# Patient Record
Sex: MALE | Race: WHITE | NOT HISPANIC OR LATINO | Employment: FULL TIME | URBAN - METROPOLITAN AREA
[De-identification: names, ages, dates, MRNs, and addresses within clinical notes are randomized per-mention and may not be internally consistent; named-entity substitution may affect disease eponyms.]

---

## 2017-03-28 RX ORDER — OMEPRAZOLE 20 MG/1
CAPSULE, DELAYED RELEASE ORAL
Qty: 90 CAPSULE | Refills: 1 | Status: SHIPPED | OUTPATIENT
Start: 2017-03-28 | End: 2017-12-27 | Stop reason: SDUPTHER

## 2017-07-31 ENCOUNTER — TELEPHONE (OUTPATIENT)
Dept: INTERNAL MEDICINE | Facility: CLINIC | Age: 72
End: 2017-07-31

## 2017-07-31 ENCOUNTER — LAB VISIT (OUTPATIENT)
Dept: LAB | Facility: HOSPITAL | Age: 72
End: 2017-07-31
Attending: FAMILY MEDICINE
Payer: MEDICARE

## 2017-07-31 DIAGNOSIS — D50.9 IRON DEFICIENCY ANEMIA, UNSPECIFIED IRON DEFICIENCY ANEMIA TYPE: ICD-10-CM

## 2017-07-31 DIAGNOSIS — Z00.00 ROUTINE GENERAL MEDICAL EXAMINATION AT A HEALTH CARE FACILITY: ICD-10-CM

## 2017-07-31 DIAGNOSIS — I25.10 CORONARY ARTERY DISEASE INVOLVING NATIVE CORONARY ARTERY WITHOUT ANGINA PECTORIS, UNSPECIFIED WHETHER NATIVE OR TRANSPLANTED HEART: ICD-10-CM

## 2017-07-31 DIAGNOSIS — I10 ESSENTIAL HYPERTENSION: ICD-10-CM

## 2017-07-31 DIAGNOSIS — R73.09 ABNORMAL GLUCOSE: ICD-10-CM

## 2017-07-31 DIAGNOSIS — Z12.5 PROSTATE CANCER SCREENING: ICD-10-CM

## 2017-07-31 DIAGNOSIS — E78.5 HYPERLIPIDEMIA, UNSPECIFIED HYPERLIPIDEMIA TYPE: ICD-10-CM

## 2017-07-31 DIAGNOSIS — Z00.00 ROUTINE GENERAL MEDICAL EXAMINATION AT A HEALTH CARE FACILITY: Primary | ICD-10-CM

## 2017-07-31 LAB
ALBUMIN SERPL BCP-MCNC: 3.4 G/DL
ALP SERPL-CCNC: 63 U/L
ALT SERPL W/O P-5'-P-CCNC: 9 U/L
ANION GAP SERPL CALC-SCNC: 12 MMOL/L
AST SERPL-CCNC: 13 U/L
BASOPHILS # BLD AUTO: 0.03 K/UL
BASOPHILS NFR BLD: 0.4 %
BILIRUB SERPL-MCNC: 0.5 MG/DL
BUN SERPL-MCNC: 26 MG/DL
CALCIUM SERPL-MCNC: 9.2 MG/DL
CHLORIDE SERPL-SCNC: 105 MMOL/L
CHOLEST/HDLC SERPL: 2.8 {RATIO}
CO2 SERPL-SCNC: 23 MMOL/L
COMPLEXED PSA SERPL-MCNC: 1.5 NG/ML
CREAT SERPL-MCNC: 1.5 MG/DL
DIFFERENTIAL METHOD: ABNORMAL
EOSINOPHIL # BLD AUTO: 0.4 K/UL
EOSINOPHIL NFR BLD: 4.5 %
ERYTHROCYTE [DISTWIDTH] IN BLOOD BY AUTOMATED COUNT: 14.3 %
EST. GFR  (AFRICAN AMERICAN): 54.1 ML/MIN/1.73 M^2
EST. GFR  (NON AFRICAN AMERICAN): 46.8 ML/MIN/1.73 M^2
GLUCOSE SERPL-MCNC: 87 MG/DL
HCT VFR BLD AUTO: 38.7 %
HDL/CHOLESTEROL RATIO: 35.5 %
HDLC SERPL-MCNC: 141 MG/DL
HDLC SERPL-MCNC: 50 MG/DL
HGB BLD-MCNC: 12.4 G/DL
IRON SERPL-MCNC: 75 UG/DL
LDLC SERPL CALC-MCNC: 73.2 MG/DL
LYMPHOCYTES # BLD AUTO: 2.1 K/UL
LYMPHOCYTES NFR BLD: 27.4 %
MCH RBC QN AUTO: 29.4 PG
MCHC RBC AUTO-ENTMCNC: 32 G/DL
MCV RBC AUTO: 92 FL
MONOCYTES # BLD AUTO: 0.6 K/UL
MONOCYTES NFR BLD: 7.3 %
NEUTROPHILS # BLD AUTO: 4.7 K/UL
NEUTROPHILS NFR BLD: 60.1 %
NONHDLC SERPL-MCNC: 91 MG/DL
PLATELET # BLD AUTO: 225 K/UL
PMV BLD AUTO: 12.4 FL
POTASSIUM SERPL-SCNC: 5.2 MMOL/L
PROT SERPL-MCNC: 6.8 G/DL
RBC # BLD AUTO: 4.22 M/UL
SATURATED IRON: 22 %
SODIUM SERPL-SCNC: 140 MMOL/L
T4 FREE SERPL-MCNC: 0.99 NG/DL
TOTAL IRON BINDING CAPACITY: 345 UG/DL
TRANSFERRIN SERPL-MCNC: 233 MG/DL
TRIGL SERPL-MCNC: 89 MG/DL
TSH SERPL DL<=0.005 MIU/L-ACNC: 3.33 UIU/ML
WBC # BLD AUTO: 7.8 K/UL

## 2017-07-31 PROCEDURE — 36415 COLL VENOUS BLD VENIPUNCTURE: CPT | Mod: PO

## 2017-07-31 PROCEDURE — 84153 ASSAY OF PSA TOTAL: CPT

## 2017-07-31 PROCEDURE — 83540 ASSAY OF IRON: CPT

## 2017-07-31 PROCEDURE — 84443 ASSAY THYROID STIM HORMONE: CPT

## 2017-07-31 PROCEDURE — 83036 HEMOGLOBIN GLYCOSYLATED A1C: CPT

## 2017-07-31 PROCEDURE — 85025 COMPLETE CBC W/AUTO DIFF WBC: CPT

## 2017-07-31 PROCEDURE — 80061 LIPID PANEL: CPT

## 2017-07-31 PROCEDURE — 84439 ASSAY OF FREE THYROXINE: CPT

## 2017-07-31 PROCEDURE — 80053 COMPREHEN METABOLIC PANEL: CPT

## 2017-08-01 LAB
ESTIMATED AVG GLUCOSE: 117 MG/DL
HBA1C MFR BLD HPLC: 5.7 %

## 2017-08-02 ENCOUNTER — OFFICE VISIT (OUTPATIENT)
Dept: INTERNAL MEDICINE | Facility: CLINIC | Age: 72
End: 2017-08-02
Payer: MEDICARE

## 2017-08-02 ENCOUNTER — PATIENT MESSAGE (OUTPATIENT)
Dept: INTERNAL MEDICINE | Facility: CLINIC | Age: 72
End: 2017-08-02

## 2017-08-02 VITALS
BODY MASS INDEX: 31.6 KG/M2 | HEART RATE: 66 BPM | WEIGHT: 213.38 LBS | TEMPERATURE: 98 F | SYSTOLIC BLOOD PRESSURE: 130 MMHG | DIASTOLIC BLOOD PRESSURE: 58 MMHG | HEIGHT: 69 IN

## 2017-08-02 DIAGNOSIS — I10 ESSENTIAL HYPERTENSION: ICD-10-CM

## 2017-08-02 DIAGNOSIS — Z85.038 HISTORY OF MALIGNANT NEOPLASM OF LARGE INTESTINE: ICD-10-CM

## 2017-08-02 DIAGNOSIS — Z00.00 ROUTINE GENERAL MEDICAL EXAMINATION AT A HEALTH CARE FACILITY: Primary | ICD-10-CM

## 2017-08-02 DIAGNOSIS — Z12.5 PROSTATE CANCER SCREENING: ICD-10-CM

## 2017-08-02 DIAGNOSIS — I25.10 CORONARY ARTERY DISEASE INVOLVING NATIVE CORONARY ARTERY WITHOUT ANGINA PECTORIS, UNSPECIFIED WHETHER NATIVE OR TRANSPLANTED HEART: ICD-10-CM

## 2017-08-02 DIAGNOSIS — K44.9 HIATAL HERNIA: ICD-10-CM

## 2017-08-02 DIAGNOSIS — R73.09 ABNORMAL GLUCOSE: ICD-10-CM

## 2017-08-02 DIAGNOSIS — D50.9 IRON DEFICIENCY ANEMIA, UNSPECIFIED IRON DEFICIENCY ANEMIA TYPE: ICD-10-CM

## 2017-08-02 DIAGNOSIS — K25.7: ICD-10-CM

## 2017-08-02 DIAGNOSIS — N52.9 ERECTILE DYSFUNCTION, UNSPECIFIED ERECTILE DYSFUNCTION TYPE: ICD-10-CM

## 2017-08-02 PROCEDURE — 99999 PR PBB SHADOW E&M-EST. PATIENT-LVL III: CPT | Mod: PBBFAC,,, | Performed by: FAMILY MEDICINE

## 2017-08-02 PROCEDURE — 99499 UNLISTED E&M SERVICE: CPT | Mod: S$GLB,,, | Performed by: FAMILY MEDICINE

## 2017-08-02 PROCEDURE — 99397 PER PM REEVAL EST PAT 65+ YR: CPT | Mod: S$GLB,,, | Performed by: FAMILY MEDICINE

## 2017-08-02 RX ORDER — SILDENAFIL 100 MG/1
100 TABLET, FILM COATED ORAL DAILY PRN
Qty: 10 TABLET | Refills: 5 | Status: SHIPPED | OUTPATIENT
Start: 2017-08-02 | End: 2017-08-03 | Stop reason: SDUPTHER

## 2017-08-02 RX ORDER — ACETAMINOPHEN 500 MG
2 TABLET ORAL DAILY
COMMUNITY

## 2017-08-02 RX ORDER — FERROUS SULFATE 324(65)MG
325 TABLET, DELAYED RELEASE (ENTERIC COATED) ORAL DAILY
COMMUNITY

## 2017-08-02 RX ORDER — AMLODIPINE BESYLATE 5 MG/1
1 TABLET ORAL EVERY MORNING
COMMUNITY
Start: 2017-07-24 | End: 2017-08-02

## 2017-08-02 NOTE — PROGRESS NOTES
Subjective:      Patient ID: Griffin Martinez is a 71 y.o. male.    Chief Complaint: Annual Exam    The patient is a 71-year-old gentleman coming in today for his wellness exam.   1. The patient had a coronary artery bypass due to blockages in the past.  Dr. Tesfaye is his current cardiologist. Done in . He is doing well. No chest pain. He is now on blood pressure medications and cholesterol medications at this time. Also seeing Dr Butterfield for cholesterol management.  Labs look good.    2. Has hx of colon cancer, followed by Dr Fuentes. with iron def anemia and hx of wilfredo's ulcers .colonoscopy done in . Benign polyps found. Repeat in 2017. Put off thus far due to having dental implants done but willing to get scheduled before end of this year. Anemia at 12.4.   3.hx of prediabetes. Improved a1c at 5.7. Working on diet.    4. gerd- stable with omprazole.   5. ckd- noted, new. More dehydrated due to sweating more working outside.   6. ED- worse lately. Wanting to try meds.   6. Mildly elevated potassium- stable, at 5.2. Eating tomatoes daily.       PAST MEDICAL HISTORY: Includes coronary artery disease, colon cancer, iron def anemia, vit D def,  and a history of degenerative disk disease and hip fracture.     PAST SURGICAL HISTORY: Includes coronary artery disease and also femur repair.     FAMILY HISTORY: Father  due to a heart attack. Mother  due to ALS.           Lab Results   Component Value Date    WBC 7.80 2017    HGB 12.4 (L) 2017    HCT 38.7 (L) 2017     2017    CHOL 141 2017    TRIG 89 2017    HDL 50 2017    ALT 9 (L) 2017    AST 13 2017     2017    K 5.2 (H) 2017     2017    CREATININE 1.5 (H) 2017    BUN 26 (H) 2017    CO2 23 2017    TSH 3.331 2017    PSA 1.5 2017    HGBA1C 5.7 (H) 2017       Review of Systems   Constitutional: Negative for chills, fatigue and  unexpected weight change.   HENT: Negative for congestion, ear pain, postnasal drip, sneezing, sore throat and trouble swallowing.    Eyes: Negative for pain and visual disturbance.   Respiratory: Negative for cough and shortness of breath.    Cardiovascular: Negative for chest pain and leg swelling.   Gastrointestinal: Negative for abdominal pain, constipation, diarrhea, nausea and vomiting.   Endocrine: Negative for cold intolerance and heat intolerance.   Genitourinary: Negative for difficulty urinating, dysuria and flank pain.   Musculoskeletal: Negative for arthralgias, back pain, joint swelling and neck pain.   Skin: Negative for color change and rash.   Neurological: Negative for dizziness, seizures and headaches.   Psychiatric/Behavioral: Negative for behavioral problems and dysphoric mood.     Objective:     Physical Exam   Constitutional: He is oriented to person, place, and time. He appears well-developed and well-nourished. No distress.   Obese WM   HENT:   Head: Normocephalic and atraumatic.   Right Ear: External ear normal.   Left Ear: External ear normal.   Nose: Nose normal.   Mouth/Throat: Oropharynx is clear and moist.   Eyes: EOM are normal. Pupils are equal, round, and reactive to light.   Neck: Normal range of motion. Neck supple. No thyromegaly present.   Cardiovascular: Normal rate and regular rhythm.  Exam reveals no gallop and no friction rub.    No murmur heard.  Pulmonary/Chest: Effort normal and breath sounds normal. No respiratory distress.   Abdominal: Soft. Bowel sounds are normal. He exhibits no distension. There is no tenderness. There is no rebound.   Musculoskeletal: Normal range of motion.   Lymphadenopathy:     He has no cervical adenopathy.   Neurological: He is alert and oriented to person, place, and time. Coordination normal.   Skin: Skin is warm and dry.   Psychiatric: He has a normal mood and affect.   Vitals reviewed.    Assessment:     1. Routine general medical  examination at a health care facility    2. Coronary artery disease involving native coronary artery without angina pectoris, unspecified whether native or transplanted heart    3. Essential hypertension    4. Iron deficiency anemia, unspecified iron deficiency anemia type    5. History of malignant neoplasm of large intestine    6. Hiatal hernia    7. Jose G ulcer, chronic    8. Erectile dysfunction, unspecified erectile dysfunction type    9. Prostate cancer screening    10. Abnormal glucose      Plan:   Griffin was seen today for annual exam.    Diagnoses and all orders for this visit:    Routine general medical examination at a health care facility - labs reviewed. Discussed Health Maintenance issues.     -     Lipid panel; Future  -     Hemoglobin A1c; Future  -     Comprehensive metabolic panel; Future  -     CBC auto differential; Future  -     TSH; Future  -     Iron and TIBC; Future  -     PSA, Screening; Future  -     Microalbumin/creatinine urine ratio; Future    Coronary artery disease involving native coronary artery without angina pectoris, unspecified whether native or transplanted heart - stable, Continue with current medications and interventions. Labs reviewed.     -     Lipid panel; Future  -     Hemoglobin A1c; Future  -     Comprehensive metabolic panel; Future  -     CBC auto differential; Future  -     TSH; Future  -     Iron and TIBC; Future  -     PSA, Screening; Future  -     Microalbumin/creatinine urine ratio; Future    Essential hypertension- stable, Continue with current medications and interventions. Labs reviewed.     -     Lipid panel; Future  -     Hemoglobin A1c; Future  -     Comprehensive metabolic panel; Future  -     CBC auto differential; Future  -     TSH; Future  -     Iron and TIBC; Future  -     PSA, Screening; Future  -     Microalbumin/creatinine urine ratio; Future    Iron deficiency anemia, unspecified iron deficiency anemia type- stable, Continue with current  medications and interventions. Labs reviewed. Needs repeat egd this year with dr pollack.     -     Case request GI: COLONOSCOPY  -     Lipid panel; Future  -     Hemoglobin A1c; Future  -     Comprehensive metabolic panel; Future  -     CBC auto differential; Future  -     TSH; Future  -     Iron and TIBC; Future  -     PSA, Screening; Future  -     Microalbumin/creatinine urine ratio; Future    History of malignant neoplasm of large intestine- stable, Continue with current medications and interventions. Labs reviewed. Needs repeat colonoscopy this year with dr pollack. Placed order  -     Lipid panel; Future  -     Hemoglobin A1c; Future  -     Comprehensive metabolic panel; Future  -     CBC auto differential; Future  -     TSH; Future  -     Iron and TIBC; Future  -     PSA, Screening; Future  -     Microalbumin/creatinine urine ratio; Future    Hiatal hernia- stable, Continue with current medications and interventions. Labs reviewed. Needs repeat egd this year with dr pollack.  -     Case request GI: COLONOSCOPY  -     Lipid panel; Future  -     Hemoglobin A1c; Future  -     Comprehensive metabolic panel; Future  -     CBC auto differential; Future  -     TSH; Future  -     Iron and TIBC; Future  -     PSA, Screening; Future  -     Microalbumin/creatinine urine ratio; Future    Jose G ulcer, chronic- stable, Continue with current medications and interventions. Labs reviewed. Needs repeat egd this year with dr pollack.  -     Case request GI: COLONOSCOPY  -     Lipid panel; Future  -     Hemoglobin A1c; Future  -     Comprehensive metabolic panel; Future  -     CBC auto differential; Future  -     TSH; Future  -     Iron and TIBC; Future  -     PSA, Screening; Future  -     Microalbumin/creatinine urine ratio; Future    Erectile dysfunction, unspecified erectile dysfunction type- new, with htn, hyperlipidemia and DM, s/p cabg. Not on nitrates.   Comments:  trial of viagra.   Orders:  -     Lipid panel;  Future  -     Hemoglobin A1c; Future  -     Comprehensive metabolic panel; Future  -     CBC auto differential; Future  -     TSH; Future  -     Iron and TIBC; Future  -     PSA, Screening; Future  -     Microalbumin/creatinine urine ratio; Future    Prostate cancer screening  -     PSA, Screening; Future    Abnormal glucose- predm, work on diet. Not on meds.   -     Hemoglobin A1c; Future    Other orders  -     sildenafil (VIAGRA) 100 MG tablet; Take 1 tablet (100 mg total) by mouth daily as needed for Erectile Dysfunction.            Return for physical.

## 2017-08-03 ENCOUNTER — PATIENT MESSAGE (OUTPATIENT)
Dept: INTERNAL MEDICINE | Facility: CLINIC | Age: 72
End: 2017-08-03

## 2017-08-03 RX ORDER — SILDENAFIL CITRATE 20 MG/1
TABLET ORAL
Qty: 30 TABLET | Refills: 5 | Status: SHIPPED | OUTPATIENT
Start: 2017-08-03

## 2017-08-07 ENCOUNTER — OFFICE VISIT (OUTPATIENT)
Dept: URGENT CARE | Facility: CLINIC | Age: 72
End: 2017-08-07
Payer: MEDICARE

## 2017-08-07 VITALS
WEIGHT: 211.88 LBS | TEMPERATURE: 98 F | HEART RATE: 69 BPM | BODY MASS INDEX: 31.29 KG/M2 | SYSTOLIC BLOOD PRESSURE: 120 MMHG | OXYGEN SATURATION: 98 % | DIASTOLIC BLOOD PRESSURE: 80 MMHG

## 2017-08-07 DIAGNOSIS — R05.9 COUGH: ICD-10-CM

## 2017-08-07 DIAGNOSIS — J32.9 SINOBRONCHITIS: Primary | ICD-10-CM

## 2017-08-07 DIAGNOSIS — J40 SINOBRONCHITIS: Primary | ICD-10-CM

## 2017-08-07 PROCEDURE — 1126F AMNT PAIN NOTED NONE PRSNT: CPT | Mod: S$GLB,,, | Performed by: NURSE PRACTITIONER

## 2017-08-07 PROCEDURE — 3074F SYST BP LT 130 MM HG: CPT | Mod: S$GLB,,, | Performed by: NURSE PRACTITIONER

## 2017-08-07 PROCEDURE — 99214 OFFICE O/P EST MOD 30 MIN: CPT | Mod: S$GLB,,, | Performed by: NURSE PRACTITIONER

## 2017-08-07 PROCEDURE — 99999 PR PBB SHADOW E&M-EST. PATIENT-LVL IV: CPT | Mod: PBBFAC,,, | Performed by: NURSE PRACTITIONER

## 2017-08-07 PROCEDURE — 3008F BODY MASS INDEX DOCD: CPT | Mod: S$GLB,,, | Performed by: NURSE PRACTITIONER

## 2017-08-07 PROCEDURE — 1159F MED LIST DOCD IN RCRD: CPT | Mod: S$GLB,,, | Performed by: NURSE PRACTITIONER

## 2017-08-07 PROCEDURE — 3079F DIAST BP 80-89 MM HG: CPT | Mod: S$GLB,,, | Performed by: NURSE PRACTITIONER

## 2017-08-07 RX ORDER — ALBUTEROL SULFATE 90 UG/1
2 AEROSOL, METERED RESPIRATORY (INHALATION) EVERY 6 HOURS PRN
Qty: 1 INHALER | Refills: 0 | Status: SHIPPED | OUTPATIENT
Start: 2017-08-07

## 2017-08-07 RX ORDER — AZITHROMYCIN 250 MG/1
TABLET, FILM COATED ORAL
Qty: 6 TABLET | Refills: 0 | Status: SHIPPED | OUTPATIENT
Start: 2017-08-07 | End: 2017-09-28 | Stop reason: ALTCHOICE

## 2017-08-07 NOTE — PATIENT INSTRUCTIONS
Bronchitis, Antibiotic Treatment (Adult)    Bronchitis is an infection of the air passages (bronchial tubes) in your lungs. It often occurs when you have a cold. This illness is contagious during the first few days and is spread through the air by coughing and sneezing, or by direct contact (touching the sick person and then touching your own eyes, nose, or mouth).  Symptoms of bronchitis include cough with mucus (phlegm) and low-grade fever. Bronchitis usually lasts 7 to 14 days. Mild cases can be treated with simple home remedies. More severe infection is treated with an antibiotic.  Home care  Follow these guidelines when caring for yourself at home:  · If your symptoms are severe, rest at home for the first 2 to 3 days. When you go back to your usual activities, don't let yourself get too tired.  · Do not smoke. Also avoid being exposed to secondhand smoke.  · You may use over-the-counter medicines to control fever or pain, unless another medicine was prescribed. (Note: If you have chronic liver or kidney disease or have ever had a stomach ulcer or gastrointestinal bleeding, talk with your healthcare provider before using these medicines. Also talk to your provider if you are taking medicine to prevent blood clots.) Aspirin should never be given to anyone younger than 18 years of age who is ill with a viral infection or fever. It may cause severe liver or brain damage.  · Your appetite may be poor, so a light diet is fine. Avoid dehydration by drinking 6 to 8 glasses of fluids per day (such as water, soft drinks, sports drinks, juices, tea, or soup). Extra fluids will help loosen secretions in the nose and lungs.  · Over-the-counter cough, cold, and sore-throat medicines will not shorten the length of the illness, but they may be helpful to reduce symptoms. (Note: Do not use decongestants if you have high blood pressure.)  · Finish all antibiotic medicine. Do this even if you are feeling better after only  a few days.  Follow-up care  Follow up with your healthcare provider, or as advised. If you had an X-ray or ECG (electrocardiogram), a specialist will review it. You will be notified of any new findings that may affect your care.  Note: If you are age 65 or older, or if you have a chronic lung disease or condition that affects your immune system, or you smoke, talk to your healthcare provider about having pneumococcal vaccinations and a yearly influenza vaccination (flu shot).  When to seek medical advice  Call your healthcare provider right away if any of these occur:  · Fever of 100.4°F (38°C) or higher  · Coughing up increased amounts of colored sputum  · Weakness, drowsiness, headache, facial pain, ear pain, or a stiff neck  Call 911, or get immediate medical care  Contact emergency services right away if any of these occur.  · Coughing up blood  · Worsening weakness, drowsiness, headache, or stiff neck  · Trouble breathing, wheezing, or pain with breathing  Date Last Reviewed: 9/13/2015 © 2000-2016 [employer]. [address] [place] [address], [place] [address]. All rights reserved. This information is not intended as a substitute for professional medical care. Always follow your healthcare professional's instructions.

## 2017-08-07 NOTE — PROGRESS NOTES
Subjective:       Patient ID: Griffin Martinez is a 71 y.o. male.    Chief Complaint: Cough    Cough   This is a new problem. Episode onset: 6 days. The problem has been gradually worsening. The problem occurs constantly. The cough is productive of sputum. Associated symptoms include nasal congestion, postnasal drip, rhinorrhea and wheezing. Pertinent negatives include no chest pain, chills, ear congestion, ear pain, eye redness, fever, headaches, heartburn, hemoptysis, myalgias, rash, sore throat, shortness of breath, sweats or weight loss. The symptoms are aggravated by lying down. Treatments tried: coricidin airborne. The treatment provided mild relief. There is no history of environmental allergies.       /80 (BP Location: Left arm, Patient Position: Sitting, BP Method: Manual)   Pulse 69   Temp 97.7 °F (36.5 °C) (Tympanic)   Wt 96.1 kg (211 lb 13.8 oz)   SpO2 98%   BMI 31.29 kg/m²     Review of Systems   Constitutional: Negative for activity change, appetite change, chills, diaphoresis, fatigue, fever, unexpected weight change and weight loss.   HENT: Positive for congestion, postnasal drip, rhinorrhea and sneezing. Negative for dental problem, drooling, ear discharge, ear pain, facial swelling, hearing loss, mouth sores, nosebleeds, sinus pressure, sore throat, tinnitus, trouble swallowing and voice change.    Eyes: Negative for photophobia, pain, discharge, redness, itching and visual disturbance.   Respiratory: Positive for cough and wheezing. Negative for apnea, hemoptysis, choking, chest tightness, shortness of breath and stridor.    Cardiovascular: Negative for chest pain, palpitations and leg swelling.   Gastrointestinal: Negative for abdominal distention, abdominal pain, anal bleeding, blood in stool, constipation, diarrhea, heartburn, nausea, rectal pain and vomiting.   Endocrine: Negative.    Genitourinary: Negative for decreased urine volume, difficulty urinating, dysuria, hematuria and  urgency.   Musculoskeletal: Negative for arthralgias, gait problem, joint swelling, myalgias, neck pain and neck stiffness.   Skin: Negative for color change, pallor, rash and wound.   Allergic/Immunologic: Negative for environmental allergies, food allergies and immunocompromised state.   Neurological: Negative for dizziness, tremors, seizures, syncope, facial asymmetry, speech difficulty, weakness, light-headedness, numbness and headaches.   Hematological: Negative for adenopathy. Does not bruise/bleed easily.   Psychiatric/Behavioral: Negative for agitation, behavioral problems, confusion, decreased concentration, dysphoric mood, hallucinations and sleep disturbance. The patient is not nervous/anxious and is not hyperactive.        Objective:      Physical Exam   Constitutional: He is oriented to person, place, and time. He appears well-developed and well-nourished. No distress.   HENT:   Head: Normocephalic and atraumatic.   Right Ear: Tympanic membrane, external ear and ear canal normal. No decreased hearing is noted.   Left Ear: Tympanic membrane, external ear and ear canal normal. No decreased hearing is noted.   Nose: Mucosal edema and rhinorrhea present. No sinus tenderness. No epistaxis. Right sinus exhibits no maxillary sinus tenderness and no frontal sinus tenderness. Left sinus exhibits no maxillary sinus tenderness and no frontal sinus tenderness.   Mouth/Throat: Uvula is midline, oropharynx is clear and moist and mucous membranes are normal. No oropharyngeal exudate, posterior oropharyngeal edema, posterior oropharyngeal erythema or tonsillar abscesses.   Eyes: Conjunctivae are normal. Right eye exhibits no discharge. Left eye exhibits no discharge.   Neck: Normal range of motion.   Cardiovascular: Normal rate, regular rhythm and normal heart sounds.    No murmur heard.  Pulmonary/Chest: Effort normal. No accessory muscle usage. No respiratory distress. He has no decreased breath sounds. He has no  wheezes. He has no rhonchi. He has no rales. He exhibits no tenderness.   Abdominal: Soft. There is no tenderness.   Musculoskeletal: Normal range of motion. He exhibits no edema.   Neurological: He is alert and oriented to person, place, and time.   Skin: Skin is warm and dry. He is not diaphoretic. No erythema.   Psychiatric: He has a normal mood and affect. His behavior is normal.   Nursing note and vitals reviewed.      Assessment:       1. Sinobronchitis    2. Cough        Plan:       Griffin was seen today for cough.    Diagnoses and all orders for this visit:    Sinobronchitis- not improving after 6 days of supportive care, wheezing at night  -     albuterol 90 mcg/actuation inhaler; Inhale 2 puffs into the lungs every 6 (six) hours as needed for Wheezing.  -     azithromycin (Z-OSCAR) 250 MG tablet; As per packet instructions  -     dextromethorphan-guaifenesin  mg (MUCINEX DM)  mg per 12 hr tablet; Take 2 tablets by mouth 2 (two) times daily.  Rest  Drink plenty of clear fluids  Nasal saline spray to clear nasal drainage and help with nasal congestion  Zyrtec or Claritin to help dry mucus and post nasal drip  Warm salt water gargles for throat comfort  Chloraseptic spray or lozenges for throat comfort  See Primary Care Physician or go to ER if symptoms worsen of fail to improve with treatment.      Cough  -     albuterol 90 mcg/actuation inhaler; Inhale 2 puffs into the lungs every 6 (six) hours as needed for Wheezing.  -     azithromycin (Z-OSCAR) 250 MG tablet; As per packet instructions  -     dextromethorphan-guaifenesin  mg (MUCINEX DM)  mg per 12 hr tablet; Take 2 tablets by mouth 2 (two) times daily.

## 2017-09-22 ENCOUNTER — PATIENT MESSAGE (OUTPATIENT)
Dept: INTERNAL MEDICINE | Facility: CLINIC | Age: 72
End: 2017-09-22

## 2017-09-26 RX ORDER — SODIUM, POTASSIUM,MAG SULFATES 17.5-3.13G
SOLUTION, RECONSTITUTED, ORAL ORAL
Qty: 354 ML | Refills: 0 | Status: ON HOLD | OUTPATIENT
Start: 2017-09-26 | End: 2017-10-12 | Stop reason: HOSPADM

## 2017-09-28 ENCOUNTER — HOSPITAL ENCOUNTER (OUTPATIENT)
Dept: RADIOLOGY | Facility: HOSPITAL | Age: 72
Discharge: HOME | End: 2017-09-28
Attending: FAMILY MEDICINE
Payer: MEDICARE

## 2017-09-28 ENCOUNTER — OFFICE VISIT (OUTPATIENT)
Dept: INTERNAL MEDICINE | Facility: CLINIC | Age: 72
End: 2017-09-28
Payer: MEDICARE

## 2017-09-28 VITALS
HEART RATE: 64 BPM | SYSTOLIC BLOOD PRESSURE: 138 MMHG | WEIGHT: 202 LBS | DIASTOLIC BLOOD PRESSURE: 60 MMHG | HEIGHT: 69 IN | BODY MASS INDEX: 29.92 KG/M2 | TEMPERATURE: 98 F

## 2017-09-28 DIAGNOSIS — M89.8X1 PAIN OF RIGHT SCAPULA: ICD-10-CM

## 2017-09-28 DIAGNOSIS — M89.8X1 PAIN OF RIGHT SCAPULA: Primary | ICD-10-CM

## 2017-09-28 DIAGNOSIS — M62.830 BACK MUSCLE SPASM: ICD-10-CM

## 2017-09-28 PROCEDURE — 72070 X-RAY EXAM THORAC SPINE 2VWS: CPT | Mod: 26,,, | Performed by: RADIOLOGY

## 2017-09-28 PROCEDURE — 3075F SYST BP GE 130 - 139MM HG: CPT | Mod: S$GLB,,, | Performed by: FAMILY MEDICINE

## 2017-09-28 PROCEDURE — 3078F DIAST BP <80 MM HG: CPT | Mod: S$GLB,,, | Performed by: FAMILY MEDICINE

## 2017-09-28 PROCEDURE — 99999 PR PBB SHADOW E&M-EST. PATIENT-LVL III: CPT | Mod: PBBFAC,,, | Performed by: FAMILY MEDICINE

## 2017-09-28 PROCEDURE — 1126F AMNT PAIN NOTED NONE PRSNT: CPT | Mod: S$GLB,,, | Performed by: FAMILY MEDICINE

## 2017-09-28 PROCEDURE — 73010 X-RAY EXAM OF SHOULDER BLADE: CPT | Mod: 26,RT,, | Performed by: RADIOLOGY

## 2017-09-28 PROCEDURE — 72070 X-RAY EXAM THORAC SPINE 2VWS: CPT | Mod: TC,PO

## 2017-09-28 PROCEDURE — 99214 OFFICE O/P EST MOD 30 MIN: CPT | Mod: S$GLB,,, | Performed by: FAMILY MEDICINE

## 2017-09-28 PROCEDURE — 1159F MED LIST DOCD IN RCRD: CPT | Mod: S$GLB,,, | Performed by: FAMILY MEDICINE

## 2017-09-28 PROCEDURE — 3008F BODY MASS INDEX DOCD: CPT | Mod: S$GLB,,, | Performed by: FAMILY MEDICINE

## 2017-09-28 PROCEDURE — 73010 X-RAY EXAM OF SHOULDER BLADE: CPT | Mod: TC,PO,RT

## 2017-09-28 RX ORDER — TRAMADOL HYDROCHLORIDE 50 MG/1
50 TABLET ORAL EVERY 6 HOURS PRN
Qty: 30 TABLET | Refills: 0 | Status: SHIPPED | OUTPATIENT
Start: 2017-09-28 | End: 2017-10-08

## 2017-09-28 RX ORDER — TIZANIDINE 4 MG/1
2-4 TABLET ORAL EVERY 6 HOURS PRN
Qty: 30 TABLET | Refills: 0 | Status: SHIPPED | OUTPATIENT
Start: 2017-09-28 | End: 2017-10-08

## 2017-09-28 NOTE — PROGRESS NOTES
Subjective:      Patient ID: Griffin Martinez is a 71 y.o. male.    Chief Complaint: Shoulder Pain    Patient's coming in today because he's been experiencing shoulder pain and scapular pain for the past 5 weeks.  He's been trying to work out at [employer] to improve his back and his upper body.  However he's uncertain when but he started experiencing pain under his right scapula.  It's a 10 out of 10 at its worse at times today to 9 out of 10.  He states he can lift he can  a can sit without any problems when he goes to lay down he feels a sharp shooting pain through his scapula into his back.  He cannot get comfortable.  His wife has done several bumps and even he's done deep tissue massage which has not helped.  He's uncertain what to do next.  He is not able to get any comfort to get sleep.  He is willing to do physical therapy.  He does have a history of colon cancer.      Shoulder Pain    The pain is present in the right shoulder (right scapula). This is a new problem. The current episode started more than 1 month ago. There has been no history of extremity trauma. The problem occurs constantly. The problem has been gradually worsening. The quality of the pain is described as sharp and aching. The pain is at a severity of 9/10. The pain is severe. Associated symptoms include a limited range of motion and tingling. Pertinent negatives include no fever, headaches, inability to bear weight, itching, joint locking, joint swelling, numbness or stiffness. The symptoms are aggravated by lying down and activity. He has tried cold, NSAIDS, OTC ointments, OTC pain meds, movement, heat, acetaminophen and rest for the symptoms. The treatment provided no relief. There is no history of Injuries to Extremity.       Lab Results   Component Value Date    WBC 7.80 07/31/2017    HGB 12.4 (L) 07/31/2017    HCT 38.7 (L) 07/31/2017     07/31/2017    CHOL 141 07/31/2017    TRIG 89 07/31/2017    HDL 50 07/31/2017     ALT 9 (L) 07/31/2017    AST 13 07/31/2017     07/31/2017    K 5.2 (H) 07/31/2017     07/31/2017    CREATININE 1.5 (H) 07/31/2017    BUN 26 (H) 07/31/2017    CO2 23 07/31/2017    TSH 3.331 07/31/2017    PSA 1.5 07/31/2017    HGBA1C 5.7 (H) 07/31/2017       Review of Systems   Constitutional: Positive for activity change. Negative for fever.   Musculoskeletal: Positive for arthralgias, back pain and myalgias. Negative for neck pain, neck stiffness and stiffness.   Skin: Negative for itching.   Neurological: Positive for tingling. Negative for weakness, numbness and headaches.     Objective:     Physical Exam   Constitutional: He appears well-developed and well-nourished.   Cardiovascular: Normal rate and regular rhythm.    Pulmonary/Chest: Effort normal and breath sounds normal.   Musculoskeletal:        Right shoulder: He exhibits pain and spasm. He exhibits normal range of motion, no tenderness and no bony tenderness.        Cervical back: He exhibits tenderness, pain and spasm. He exhibits normal range of motion and no bony tenderness.        Back:    No pain with range of motion and no pain with liftoff no pain with Brito testing.  It is discrete point tenderness around the right scapular area with palpation today   Neurological: He is alert. He has normal strength. He displays no atrophy and no tremor. No sensory deficit. He exhibits normal muscle tone.   Vitals reviewed.    Assessment:     1. Pain of right scapula    2. Back muscle spasm      Plan:   Griffin was seen today for shoulder pain.    Diagnoses and all orders for this visit:    Pain of right scapula- new.   Comments:  5 weeks, start zanalex, tramadol, [facility] today. 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   Orders:  -     X-Ray Thoracic Spine AP Lateral; Future  -     X-Ray Scapula Right; Future  -     Ambulatory Referral to Physical/Occupational  Therapy    Back muscle spasm  Comments:  5 weeks, start zanalex, tramadol, refer to [facility] x[facility] today. 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     Orders:  -     X-Ray Thoracic Spine AP Lateral; Future  -     X-Ray Scapula Right; Future  -     Ambulatory Referral to Physical/Occupational Therapy    Other orders  -     tizanidine (ZANAFLEX) 4 MG tablet; Take 0.5-1 tablets (2-4 mg total) by mouth every 6 (six) hours as needed.  -     tramadol (ULTRAM) 50 mg tablet; Take 1 tablet (50 mg total) by mouth every 6 (six) hours as needed for Pain (take with tylenol 500mg).            Return if symptoms worsen or fail to improve.

## 2017-10-12 ENCOUNTER — ANESTHESIA (OUTPATIENT)
Dept: ENDOSCOPY | Facility: HOSPITAL | Age: 72
End: 2017-10-12
Payer: MEDICARE

## 2017-10-12 ENCOUNTER — SURGERY (OUTPATIENT)
Age: 72
End: 2017-10-12

## 2017-10-12 ENCOUNTER — HOSPITAL ENCOUNTER (OUTPATIENT)
Facility: HOSPITAL | Age: 72
Discharge: HOME | End: 2017-10-12
Attending: INTERNAL MEDICINE | Admitting: INTERNAL MEDICINE
Payer: MEDICARE

## 2017-10-12 ENCOUNTER — ANESTHESIA EVENT (OUTPATIENT)
Dept: ENDOSCOPY | Facility: HOSPITAL | Age: 72
End: 2017-10-12
Payer: MEDICARE

## 2017-10-12 VITALS
SYSTOLIC BLOOD PRESSURE: 138 MMHG | OXYGEN SATURATION: 98 % | DIASTOLIC BLOOD PRESSURE: 79 MMHG | RESPIRATION RATE: 18 BRPM | HEART RATE: 75 BPM | WEIGHT: 195 LBS | TEMPERATURE: 98 F | HEIGHT: 69 IN | BODY MASS INDEX: 28.88 KG/M2

## 2017-10-12 DIAGNOSIS — Z85.038 HISTORY OF COLON CANCER: ICD-10-CM

## 2017-10-12 PROCEDURE — 45380 COLONOSCOPY AND BIOPSY: CPT | Performed by: INTERNAL MEDICINE

## 2017-10-12 PROCEDURE — 25000003 PHARM REV CODE 250: Performed by: INTERNAL MEDICINE

## 2017-10-12 PROCEDURE — 37000009 HC ANESTHESIA EA ADD 15 MINS: Performed by: INTERNAL MEDICINE

## 2017-10-12 PROCEDURE — 37000008 HC ANESTHESIA 1ST 15 MINUTES: Performed by: INTERNAL MEDICINE

## 2017-10-12 PROCEDURE — 45380 COLONOSCOPY AND BIOPSY: CPT | Mod: PT,,, | Performed by: INTERNAL MEDICINE

## 2017-10-12 PROCEDURE — 88305 TISSUE EXAM BY PATHOLOGIST: CPT | Performed by: PATHOLOGY

## 2017-10-12 PROCEDURE — 27201012 HC FORCEPS, HOT/COLD, DISP: Performed by: INTERNAL MEDICINE

## 2017-10-12 PROCEDURE — 88305 TISSUE EXAM BY PATHOLOGIST: CPT | Mod: 26,,, | Performed by: PATHOLOGY

## 2017-10-12 PROCEDURE — 63600175 PHARM REV CODE 636 W HCPCS: Performed by: NURSE ANESTHETIST, CERTIFIED REGISTERED

## 2017-10-12 RX ORDER — PROPOFOL 10 MG/ML
VIAL (ML) INTRAVENOUS
Status: DISCONTINUED | OUTPATIENT
Start: 2017-10-12 | End: 2017-10-12

## 2017-10-12 RX ORDER — LIDOCAINE HCL/PF 100 MG/5ML
SYRINGE (ML) INTRAVENOUS
Status: DISCONTINUED | OUTPATIENT
Start: 2017-10-12 | End: 2017-10-12

## 2017-10-12 RX ORDER — SODIUM CHLORIDE, SODIUM LACTATE, POTASSIUM CHLORIDE, CALCIUM CHLORIDE 600; 310; 30; 20 MG/100ML; MG/100ML; MG/100ML; MG/100ML
INJECTION, SOLUTION INTRAVENOUS CONTINUOUS
Status: DISCONTINUED | OUTPATIENT
Start: 2017-10-12 | End: 2017-10-12 | Stop reason: HOSPADM

## 2017-10-12 RX ADMIN — SODIUM CHLORIDE, SODIUM LACTATE, POTASSIUM CHLORIDE, AND CALCIUM CHLORIDE: 600; 310; 30; 20 INJECTION, SOLUTION INTRAVENOUS at 11:10

## 2017-10-12 RX ADMIN — PROPOFOL 80 MG: 10 INJECTION, EMULSION INTRAVENOUS at 11:10

## 2017-10-12 RX ADMIN — PROPOFOL 40 MG: 10 INJECTION, EMULSION INTRAVENOUS at 11:10

## 2017-10-12 RX ADMIN — LIDOCAINE HYDROCHLORIDE 80 ML: 20 INJECTION, SOLUTION INTRAVENOUS at 11:10

## 2017-10-12 NOTE — ANESTHESIA POSTPROCEDURE EVALUATION
"Anesthesia Post Evaluation    Patient: Griffin Martinez    Procedure(s) Performed: Procedure(s) (LRB):  COLONOSCOPY (N/A)    Final Anesthesia Type: MAC  Patient location during evaluation: GI PACU  Patient participation: Yes- Able to Participate  Level of consciousness: awake and alert and oriented  Post-procedure vital signs: reviewed and stable  Pain management: adequate  Airway patency: patent  PONV status at discharge: No PONV  Anesthetic complications: no      Cardiovascular status: blood pressure returned to baseline  Respiratory status: unassisted, spontaneous ventilation and room air  Hydration status: euvolemic  Follow-up not needed.        Visit Vitals  /63   Pulse 74   Temp 36.6 °C (97.8 °F) (Oral)   Resp 20   Ht 5' 9" (1.753 m)   Wt 88.5 kg (195 lb)   SpO2 98%   BMI 28.80 kg/m²       Pain/August Score: Pain Assessment Performed: Yes (10/12/2017 11:18 AM)  Presence of Pain: denies (10/12/2017 11:18 AM)      "

## 2017-10-12 NOTE — DISCHARGE INSTRUCTIONS
Diverticulosis    Diverticulosis means that small pouches have formed in the wall of your large intestine (colon). Most often, this problem causes no symptoms and is common as people age. But the pouches in the colon are at risk of becoming infected. When this happens, the condition is called diverticulitis. Although most people with diverticulosis never develop diverticulitis, it is still not uncommon. Rectal bleeding can also occur and in less common situations, a type of colon inflammation called colitis.  While most people do not have symptoms, some people with diverticulosis may have:  · Abdominal cramps and pain  · Bloating  · Constipation  · Change in bowel habits  Causes  The exact cause of diverticulosis (and diverticulitis) has not been proved, but a few things are associated with the condition:  · Low-fiber diet  · Constipation  · Lack of exercise  Your healthcare provider will talk with you about how to manage your condition. Diet changes may be all that are needed to help control diverticulosis and prevent progression to diverticulitis. If you develop diverticulitis, you will likely need other treatments.  Home care  You may be told to take fiber supplements daily. Fiber adds bulk to the stool so that it passes through the colon more easily. Stool softeners may be recommended. You may also be given medications for pain relief. Be sure to take all medications as directed.  In the past, people were told to avoid corn, nuts, and seeds. This is no longer necessary.  Follow these guidelines when caring for yourself at home:  · Eat unprocessed foods that are high in fiber. Whole grains, fruits, and vegetables are good choices.  · Drink 6 to 8 glasses of water every day unless your healthcare provider has you limit how much fluid you should have.  · Watch for changes in your bowel movements. Tell your provider if you notice any changes.  · Begin an exercise program. Ask your provider how to get started.  Generally, walking is the best.  · Get plenty of rest and sleep.  Follow-up care  Follow up with your healthcare provider, or as advised. Regular visits may be needed to check on your health. Sometimes special procedures such as colonoscopy, are needed after an episode of diverticulitis or blooding. Be sure to keep all your appointments.  If a stool sample was taken, or cultures were done, you should be told if they are positive, or if your treatment needs to be changed. You can call as directed for the results.  If X-rays were done, a radiologist will look at them. You will be told if there is a change in your treatment.  If antibiotics were prescribed, be sure to finish them all.  When to seek medical advice  Call your healthcare provider right away if any of these occur:  · Fever of 100.4°F (38°C) or higher, or as directed by your healthcare provider  · Severe cramps in the lower left side of the abdomen or pain that is getting worse  · Tenderness in the lower left side of the abdomen or worsening pain throughout the abdomen  · Diarrhea or constipation that doesn't get better within 24 hours  · Nausea and vomiting  · Bleeding from the rectum  Call 911  Call emergency services if any of the following occur:  · Trouble breathing  · Confusion  · Very drowsy or trouble awakening  · Fainting or loss of consciousness  · Rapid heart rate  · Chest pain  Date Last Reviewed: 12/30/2015 © 2000-2017 [employer]. [address] [place] [id number]. All rights reserved. This information is not intended as a substitute for professional medical care. Always follow your healthcare professional's instructions.        Colonoscopy     A camera attached to a flexible tube with a viewing lens is used to take video pictures.     Colonoscopy is a test to view the inside of your lower digestive tract (colon and rectum). Sometimes it can show the last part of the small intestine (ileum). During the test, small pieces  of tissue may be removed for testing. This is called a biopsy. Small growths, such as polyps, may also be removed.   Why is colonoscopy done?  The test is done to help look for colon cancer. And it can help find the source of abdominal pain, bleeding, and changes in bowel habits. It may be needed once a year, depending on factors such as your:  · Age  · Health history  · Family health history  · Symptoms  · Results from any prior colonoscopy  Risks and possible complications  These include:  · Bleeding               · A puncture or tear in the colon   · Risks of anesthesia  · A cancer lesion not being seen  Getting ready   To prepare for the test:  · Talk with your healthcare provider about the risks of the test (see below). Also ask your healthcare provider about alternatives to the test.  · Tell your healthcare provider about any medicines you take. Also tell him or her about any health conditions you may have.  · Make sure your rectum and colon are empty for the test. Follow the diet and bowel prep instructions exactly. If you dont, the test may need to be rescheduled.  · Plan for a friend or family member to drive you home after the test.     Colonoscopy provides an inside view of the entire colon.     You may discuss the results with your doctor right away or at a future visit.  During the test   The test is usually done in the hospital on an outpatient basis. This means you go home the same day. The procedure takes about 30 minutes. During that time:  · You are given relaxing (sedating) medicine through an IV line. You may be drowsy, or fully asleep.  · The healthcare provider will first give you a physical exam to check for anal and rectal problems.  · Then the anus is lubricated and the scope inserted.  · If you are awake, you may have a feeling similar to needing to have a bowel movement. You may also feel pressure as air is pumped into the colon. Its OK to pass gas during the procedure.  · Biopsy, polyp  removal, or other treatments may be done during the test.  After the test   You may have gas right after the test. It can help to try to pass it to help prevent later bloating. Your healthcare provider may discuss the results with you right away. Or you may need to schedule a follow-up visit to talk about the results. After the test, you can go back to your normal eating and other activities. You may be tired from the sedation and need to rest for a few hours.  Date Last Reviewed: 11/1/2016 © 2000-2017 [employer]. [address] [place] [address]. All rights reserved. This information is not intended as a substitute for professional medical care. Always follow your healthcare professional's instructions.

## 2017-10-12 NOTE — H&P
Short Stay Endoscopy History and Physical    PCP - Mila Almonte MD    Procedure - Colonoscopy  ASA - 3  Mallampati - per anesthesia  History of Anesthesia problems - no  Family history Anesthesia problems -  no     HPI:  This is a 71 y.o. male here for evaluation of :     Average Risk Screening: No  High risk screening: yes  History of polyps: No  Anemia: No  Blood in stools: No  Diarrhea: No  Abdominal Pain: No    Review of Systems:  CONSTITUTIONAL: Denies weight change,  fatigue, fevers, chills, night sweats.  CARDIOVASCULAR: Denies chest pain, shortness of breath, orthopnea and edema.  RESPIRATORY: Denies cough, hemoptysis, dyspnea, and wheezing.  GI: See HPI.    Medical History:  Past Medical History:   Diagnosis Date    Anticoagulant long-term use ASA 81 mg    Colon cancer     s/p Ileocectomy in 7/13, T2N0M0    Coronary artery disease     Diabetes mellitus 08/26/2014    pre-diabetic    Hypertension        Surgical History:   Past Surgical History:   Procedure Laterality Date    ABDOMINAL SURGERY      APPENDECTOMY      CARDIAC SURGERY      COLON SURGERY      laparoscopic right     CORONARY ARTERY BYPASS GRAFT      FRACTURE SURGERY      left hip    TONSILLECTOMY         Family History:   Family History   Problem Relation Age of Onset    Heart disease Father     Hypertension Father     Hyperlipidemia Brother     Hypertension Brother        Social History:   Social History   Substance Use Topics    Smoking status: Former Smoker     Packs/day: 0.25     Years: 35.00     Quit date: 6/27/2000    Smokeless tobacco: Never Used    Alcohol use 1.8 oz/week     3 Shots of liquor per week      Comment: socially       Allergies: Reviewed.    Medications:  No current facility-administered medications on file prior to encounter.      Current Outpatient Prescriptions on File Prior to Encounter   Medication Sig Dispense Refill    aspirin (ECOTRIN) 81 MG EC tablet Take 81 mg by mouth once daily.       atorvastatin (LIPITOR) 10 MG tablet       carvedilol (COREG) 25 MG tablet Take 1 tablet (25 mg total) by mouth 2 (two) times daily. 90 tablet 3    cholecalciferol, vitamin D3, (VITAMIN D3) 2,000 unit Cap Take 2 capsules by mouth once daily.      ferrous sulfate 324 mg (65 mg iron) TbEC Take 325 mg by mouth once daily.      lisinopril (PRINIVIL,ZESTRIL) 40 MG tablet Take 1 tablet (40 mg total) by mouth 2 (two) times daily. 180 tablet 3    naproxen sodium (ANAPROX) 220 MG tablet Take 220 mg by mouth continuous prn.      omeprazole (PRILOSEC) 20 MG capsule TAKE 1 CAPSULE (20 MG TOTAL) BY MOUTH ONCE DAILY. 90 capsule 1       Physical Exam:  Vital Signs:   Vitals:    10/12/17 1114   BP: 113/72   Pulse: 75   Resp: 18   Temp: 97.8 °F (36.6 °C)     General Appearance: Well appearing in no acute distress  ENT: OP clear  Chest: CTA B  CV: RRR, no m/r/g  Abd: s/nt/nd/nabs  Ext: no edema    Labs:  Reviewed    Impression: Hx of colon cancer    Plan:  I have explained the risks and benefits of colonoscopy to the patient including but not limited to bleeding, perforation, infection, and death. The patient wishes to proceed with colonoscopy.

## 2017-10-12 NOTE — DISCHARGE SUMMARY
[facility] -   Brief Operative Note     SUMMARY     Surgery Date: 10/12/2017     Surgeon(s) and Role:     * Khurram Fuentes MD - Primary    Assisting Surgeon: None    Pre-op Diagnosis:  Hiatal hernia [K44.9]  Jose G ulcer, chronic [K25.7]  Iron deficiency anemia, unspecified iron deficiency anemia type [D50.9]    Post-op Diagnosis:  Post-Op Diagnosis Codes:     * Hiatal hernia [K44.9]     * Jose G ulcer, chronic [K25.7]     * Iron deficiency anemia, unspecified iron deficiency anemia type [D50.9]    Procedure(s) (LRB):  COLONOSCOPY (N/A)    Anesthesia: Choice    Description of the findings of the procedure: Procedure completed. See Procedure note for details.     Findings/Key Components: Procedure completed. See Procedure note for details.     Prosthesis/Implants: None    Estimated Blood Loss: less than 10         Specimens:   Specimen (12h ago through future)    Start     Ordered    10/12/17 1153  Specimen to Pathology - Surgery  Once     Comments:  1.  Biopsies of colon anastamosis ulcer      10/12/17 1159          Discharge Note    SUMMARY     Admit Date: 10/12/2017    Discharge Date and Time:  10/12/2017 12:01 PM    Hospital Course (synopsis of major diagnoses, care, treatment, and services provided during the course of the hospital stay): Procedure completed. See Procedure note for details.      Final Diagnosis: Post-Op Diagnosis Codes:     * Hiatal hernia [K44.9]     * Jose G ulcer, chronic [K25.7]     * Iron deficiency anemia, unspecified iron deficiency anemia type [D50.9]    Disposition: Home or Self Care    Follow Up/Patient Instructions:     Medications:  Reconciled Home Medications:   Current Discharge Medication List      CONTINUE these medications which have NOT CHANGED    Details   aspirin (ECOTRIN) 81 MG EC tablet Take 81 mg by mouth once daily.      atorvastatin (LIPITOR) 10 MG tablet       carvedilol (COREG) 25 MG tablet Take 1 tablet (25 mg total) by mouth 2 (two) times  daily.  Qty: 90 tablet, Refills: 3      cholecalciferol, vitamin D3, (VITAMIN D3) 2,000 unit Cap Take 2 capsules by mouth once daily.      ferrous sulfate 324 mg (65 mg iron) TbEC Take 325 mg by mouth once daily.      lisinopril (PRINIVIL,ZESTRIL) 40 MG tablet Take 1 tablet (40 mg total) by mouth 2 (two) times daily.  Qty: 180 tablet, Refills: 3      omeprazole (PRILOSEC) 20 MG capsule TAKE 1 CAPSULE (20 MG TOTAL) BY MOUTH ONCE DAILY.  Qty: 90 capsule, Refills: 1      albuterol 90 mcg/actuation inhaler Inhale 2 puffs into the lungs every 6 (six) hours as needed for Wheezing.  Qty: 1 Inhaler, Refills: 0    Associated Diagnoses: Sinobronchitis; Cough      sildenafil (REVATIO) 20 mg Tab 20-60mg po 1 hour prior to sexual activity.  Qty: 30 tablet, Refills: 5         STOP taking these medications       naproxen sodium (ANAPROX) 220 MG tablet Comments:   Reason for Stopping:         sodium,potassium,mag sulfates (SUPREP BOWEL PREP KIT) 17.5-3.13-1.6 gram SolR Comments:   Reason for Stopping:               Discharge Procedure Orders  Diet general     Activity as tolerated       Follow-up Information     Mila Almonte MD.    Specialty:  Family Medicine  Contact information:  [address]  [address]  [place] [address] [phone]

## 2017-10-12 NOTE — PLAN OF CARE
Problem: Fall Risk (Adult)  Goal: Absence of Falls  Patient will demonstrate the desired outcomes by discharge/transition of care.   Outcome: Outcome(s) achieved Date Met: 10/12/17  Pt denies c/o discomfort, dc instructions reviewed, criteria met, iv dc'd tolerated well no bleeding noted, dc to [facility] via wc with fmly

## 2017-10-12 NOTE — ANESTHESIA RELEASE NOTE
"Anesthesia Release from PACU Note    Patient: Griffin Martinez    Procedure(s) Performed: Procedure(s) (LRB):  COLONOSCOPY (N/A)    Anesthesia type: MAC    Post pain: Adequate analgesia    Post assessment: no apparent anesthetic complications and tolerated procedure well    Last Vitals:   Visit Vitals  /63   Pulse 74   Temp 36.6 °C (97.8 °F) (Oral)   Resp 20   Ht 5' 9" (1.753 m)   Wt 88.5 kg (195 lb)   SpO2 98%   BMI 28.80 kg/m²       Post vital signs: stable    Level of consciousness: awake, alert  and oriented    Nausea/Vomiting: no nausea/no vomiting    Complications: none    Airway Patency: patent    Respiratory: unassisted, spontaneous ventilation, room air    Cardiovascular: stable and blood pressure at baseline    Hydration: euvolemic  "

## 2017-10-12 NOTE — TRANSFER OF CARE
"Anesthesia Transfer of Care Note    Patient: Griffin Martinez    Procedure(s) Performed: Procedure(s) (LRB):  COLONOSCOPY (N/A)    Patient location: GI    Anesthesia Type: MAC    Transport from OR: Transported from OR on room air with adequate spontaneous ventilation    Post pain: adequate analgesia    Post assessment: no apparent anesthetic complications    Post vital signs: stable    Level of consciousness: awake, oriented and alert    Nausea/Vomiting: no nausea/vomiting    Complications: none    Transfer of care protocol was followed      Last vitals:   Visit Vitals  /63   Pulse 74   Temp 36.6 °C (97.8 °F) (Oral)   Resp 20   Ht 5' 9" (1.753 m)   Wt 88.5 kg (195 lb)   SpO2 98%   BMI 28.80 kg/m²     "

## 2017-11-14 ENCOUNTER — PATIENT MESSAGE (OUTPATIENT)
Dept: INTERNAL MEDICINE | Facility: CLINIC | Age: 72
End: 2017-11-14

## 2017-11-14 DIAGNOSIS — G89.29 CHRONIC RIGHT SHOULDER PAIN: ICD-10-CM

## 2017-11-14 DIAGNOSIS — M25.511 CHRONIC RIGHT SHOULDER PAIN: ICD-10-CM

## 2017-11-14 DIAGNOSIS — M89.8X1 PAIN OF RIGHT SCAPULA: Primary | ICD-10-CM

## 2017-12-27 RX ORDER — OMEPRAZOLE 20 MG/1
CAPSULE, DELAYED RELEASE ORAL
Qty: 90 CAPSULE | Refills: 1 | Status: SHIPPED | OUTPATIENT
Start: 2017-12-27

## 2018-01-26 ENCOUNTER — LAB VISIT (OUTPATIENT)
Dept: LAB | Facility: HOSPITAL | Age: 73
End: 2018-01-26
Attending: ORTHOPAEDIC SURGERY
Payer: MEDICARE

## 2018-01-26 DIAGNOSIS — M54.50 LUMBAGO: Primary | ICD-10-CM

## 2018-01-26 LAB
ALBUMIN SERPL BCP-MCNC: 3.5 G/DL
ALP SERPL-CCNC: 76 U/L
ALT SERPL W/O P-5'-P-CCNC: 8 U/L
ANION GAP SERPL CALC-SCNC: 8 MMOL/L
AST SERPL-CCNC: 9 U/L
BASOPHILS # BLD AUTO: 0.03 K/UL
BASOPHILS NFR BLD: 0.4 %
BILIRUB SERPL-MCNC: 0.4 MG/DL
BUN SERPL-MCNC: 20 MG/DL
CALCIUM SERPL-MCNC: 9.8 MG/DL
CEA SERPL-MCNC: 22.3 NG/ML
CHLORIDE SERPL-SCNC: 99 MMOL/L
CO2 SERPL-SCNC: 28 MMOL/L
CREAT SERPL-MCNC: 1.1 MG/DL
CRP SERPL-MCNC: 5.8 MG/L
DIFFERENTIAL METHOD: ABNORMAL
EOSINOPHIL # BLD AUTO: 0.1 K/UL
EOSINOPHIL NFR BLD: 1.1 %
ERYTHROCYTE [DISTWIDTH] IN BLOOD BY AUTOMATED COUNT: 13.5 %
ERYTHROCYTE [SEDIMENTATION RATE] IN BLOOD BY WESTERGREN METHOD: 19 MM/HR
EST. GFR  (AFRICAN AMERICAN): >60 ML/MIN/1.73 M^2
EST. GFR  (NON AFRICAN AMERICAN): >60 ML/MIN/1.73 M^2
GLUCOSE SERPL-MCNC: 119 MG/DL
HCT VFR BLD AUTO: 36.3 %
HGB BLD-MCNC: 12 G/DL
IMM GRANULOCYTES # BLD AUTO: 0.01 K/UL
IMM GRANULOCYTES NFR BLD AUTO: 0.1 %
LYMPHOCYTES # BLD AUTO: 1.5 K/UL
LYMPHOCYTES NFR BLD: 17.7 %
MCH RBC QN AUTO: 30.5 PG
MCHC RBC AUTO-ENTMCNC: 33.1 G/DL
MCV RBC AUTO: 92 FL
MONOCYTES # BLD AUTO: 0.5 K/UL
MONOCYTES NFR BLD: 5.9 %
NEUTROPHILS # BLD AUTO: 6.4 K/UL
NEUTROPHILS NFR BLD: 74.8 %
NRBC BLD-RTO: 0 /100 WBC
PLATELET # BLD AUTO: 310 K/UL
PMV BLD AUTO: 12.5 FL
POTASSIUM SERPL-SCNC: 5.2 MMOL/L
PROT SERPL-MCNC: 7.1 G/DL
RBC # BLD AUTO: 3.94 M/UL
SODIUM SERPL-SCNC: 135 MMOL/L
WBC # BLD AUTO: 8.51 K/UL

## 2018-01-26 PROCEDURE — 82378 CARCINOEMBRYONIC ANTIGEN: CPT

## 2018-01-26 PROCEDURE — 36415 COLL VENOUS BLD VENIPUNCTURE: CPT | Mod: PO

## 2018-01-26 PROCEDURE — 80053 COMPREHEN METABOLIC PANEL: CPT

## 2018-01-26 PROCEDURE — 85651 RBC SED RATE NONAUTOMATED: CPT

## 2018-01-26 PROCEDURE — 86140 C-REACTIVE PROTEIN: CPT

## 2018-01-26 PROCEDURE — 85025 COMPLETE CBC W/AUTO DIFF WBC: CPT

## 2018-02-05 ENCOUNTER — PATIENT MESSAGE (OUTPATIENT)
Dept: INTERNAL MEDICINE | Facility: CLINIC | Age: 73
End: 2018-02-05

## 2018-02-05 ENCOUNTER — TELEPHONE (OUTPATIENT)
Dept: INTERNAL MEDICINE | Facility: CLINIC | Age: 73
End: 2018-02-05

## 2018-02-05 NOTE — TELEPHONE ENCOUNTER
----- Message from Paula Ray sent at 2/5/2018  2:47 PM CST -----  Contact: [phone]  Would like to consult with nurse regarding medication.  Please call back at [phone].  Md Liz

## 2018-02-05 NOTE — TELEPHONE ENCOUNTER
Notified that they will have to go to  general and  the prescription.  Patient verbalized understanding.

## 2018-02-12 ENCOUNTER — OUTPATIENT CASE MANAGEMENT (OUTPATIENT)
Dept: ADMINISTRATIVE | Facility: OTHER | Age: 73
End: 2018-02-12

## 2018-02-12 NOTE — PROGRESS NOTES
2/12/18 - Attempted phone contact with pt on mobile number with no answer. Spoke with male who answered home number and he relayed pt is currently in a meeting with his wife and another person. He requested a call back at a later time. Name and phone number were given and advised CM will attempt to contact tomorrow. Essie Aly RN

## 2018-02-12 NOTE — PROGRESS NOTES
Patient referred from [facility] for evaluation for outpatient complex care management.  Per Ekta Kaminski RN [phone] ext. [phone] patient newly diagnosed with bone and lung cancer and requesting assistance in the home setting.  Patient assigned to Essie Aly RN for evaluation for outpatient complex care management.

## 2018-02-13 ENCOUNTER — OUTPATIENT CASE MANAGEMENT (OUTPATIENT)
Dept: ADMINISTRATIVE | Facility: OTHER | Age: 73
End: 2018-02-13

## 2018-02-13 NOTE — PROGRESS NOTES
2/13/18 - Phone call rec'd this AM from pt's wife, Mariela, returning this CM's call from yesterday. She relayed pt's recent health history of being diagnosed with bone and lung CA in Jan '18. Since diagnosis, his status has deteriorated daily, per her report, so that he is confused at times and unable to communicate effectively. He was ambulatory with a walker over the weekend, but is now too weak to walk. He has become incontinent of urine, but is unable to safely get in the tub or shower alone. He is very lethargic and talks very little. 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 Her adult son is currently assisting her with his care and she expects her sister and brother in law to arrive from out of state today. He has an appt with Dr Barahona, the oncologist that diagnosed him, tomorrow that they intend to keep. She desires a referral to some sort of home care service, but is unsure whether they want home health or hospice. She also requests a lightweight wheel chair and a BSC related to his rapidly progressing debility. She requested that CM contact Dr Barahona to request orders for these. CM made contact with Dr Barahona's nurse, Priyanka, and she advised he is not in the office today, but she will attempt to contact him regarding above. CM attempted to contact Mariela by phone to advise, but was unable to reach her and left voice mail. CM will follow up with her on 2/15/18 after he sees Dr Barahona tomorrow. CM will also message Dr PHONG Almonte, pt's PCP here at [facility] of pt's status. Essie Aly RN

## 2018-02-14 ENCOUNTER — OUTPATIENT CASE MANAGEMENT (OUTPATIENT)
Dept: ADMINISTRATIVE | Facility: OTHER | Age: 73
End: 2018-02-14

## 2018-02-15 ENCOUNTER — OUTPATIENT CASE MANAGEMENT (OUTPATIENT)
Dept: ADMINISTRATIVE | Facility: OTHER | Age: 73
End: 2018-02-15

## 2018-02-15 NOTE — PROGRESS NOTES
2/15/18 - Attempted phone contact with pt's wife for completion of Nursing Assessment with no answer. Voice mail left requesting return call. Will attempt to contact at a later time. Essie Aly RN  2/15/18 - Rec'd return call from pt's wife. She advised pt was admitted to [facility] yesterday following appt with oncologist. Advised her that CM will d/c from [facility] at this time as pt will receive comprehensive care there. Notified PHONG Darnell [facility] SW of above.  Essie Aly RN

## 2018-02-27 ENCOUNTER — OUTPATIENT CASE MANAGEMENT (OUTPATIENT)
Dept: ADMINISTRATIVE | Facility: OTHER | Age: 73
End: 2018-02-27

## 2018-02-27 NOTE — PROGRESS NOTES
Please note this patient was mailed an Outpatient Care Management Patient Satisfaction Discharge Survey on 2/27/18.    Please contact [facility] at ext. [phone] with any questions.    Thank you,    Michelle Medina, [facility]  Outpatient Complex Care Mgmt  Ext [phone]

## 2018-07-05 ENCOUNTER — PATIENT OUTREACH (OUTPATIENT)
Dept: ADMINISTRATIVE | Facility: HOSPITAL | Age: 73
End: 2018-07-05

## 2018-07-05 ENCOUNTER — TELEPHONE (OUTPATIENT)
Dept: FAMILY MEDICINE | Facility: CLINIC | Age: 73
End: 2018-07-05

## 2018-07-05 NOTE — TELEPHONE ENCOUNTER
Patient is  called and advised [employer] in health maintenance and she verbalized that she will report to the proper person.felicita

## 2023-12-11 NOTE — ANESTHESIA PREPROCEDURE EVALUATION
10/12/2017  Griffin Martinez is a 71 y.o., male.    Pre-op Assessment    I have reviewed the Patient Summary Reports.     I have reviewed the Nursing Notes.   I have reviewed the Medications.     Review of Systems  Anesthesia Hx:  No problems with previous Anesthesia  History of prior surgery of interest to airway management or planning: Previous anesthesia: General, MAC Denies Family Hx of Anesthesia complications.   Denies Personal Hx of Anesthesia complications.   Social:  Smoker, Alcohol Use Total of 40 years of about 1 ppd. Quit in 2000.   Hematology/Oncology:  Hematology Normal       -- Cancer in past history:  Oncology Comments: Colon CA     EENT/Dental:EENT/Dental Normal   Cardiovascular:   Exercise tolerance: good Hypertension, well controlled CAD  CABG/stent  Cardiologist last seen in May. Changed medications.  2012 cardiac stress test and CABG afterwards.   Pulmonary:   Snore   Renal/:  Renal/ Normal     Hepatic/GI:   PUD, Hiatal Hernia, GERD    Musculoskeletal:  Musculoskeletal Normal    Endocrine:   Diabetes, type 2    Psych:  Psychiatric Normal           Physical Exam  General:  Well nourished, Obesity    Airway/Jaw/Neck:  Airway Findings: Mouth Opening: Normal Tongue: Normal  General Airway Assessment: Adult  Mallampati: III  Improves to II with phonation.  TM Distance: Normal, at least 6 cm       Chest/Lungs:  Chest/Lungs Findings: Clear to auscultation, Normal Respiratory Rate     Heart/Vascular:  Heart Findings: Rate: Normal             Anesthesia Plan  Type of Anesthesia, risks & benefits discussed:  Anesthesia Type:  MAC  Patient's Preference:   Intra-op Monitoring Plan: standard ASA monitors  Intra-op Monitoring Plan Comments:   Post Op Pain Control Plan:   Post Op Pain Control Plan Comments:   Induction:   IV  Beta Blocker:  Patient is not currently on a Beta-Blocker (No further  Asked patient if I could talk to his daughter Sari. Pt confirmed it was okay.  Patients daughter requested that POA paper work was activated so he could go to a rehab facility. Educated daughter that at this time he is still his own person.   documentation required).       Informed Consent: Patient understands risks and agrees with Anesthesia plan.  Questions answered. Anesthesia consent signed with patient.  ASA Score: 2     Day of Surgery Review of History & Physical: I have interviewed and examined the patient. I have reviewed the patient's H&P dated: 06/26/14. There are no significant changes.  H&P update referred to the provider.         Ready For Surgery From Anesthesia Perspective.